# Patient Record
(demographics unavailable — no encounter records)

---

## 2025-03-17 NOTE — HEALTH RISK ASSESSMENT
[Yes] : Yes [2 - 4 times a month (2 pts)] : 2-4 times a month (2 points) [1 or 2 (0 pts)] : 1 or 2 (0 points) [Never (0 pts)] : Never (0 points) [No] : In the past 12 months have you used drugs other than those required for medical reasons? No [No falls in past year] : Patient reported no falls in the past year [0] : 2) Feeling down, depressed, or hopeless: Not at all (0) [PHQ-2 Negative - No further assessment needed] : PHQ-2 Negative - No further assessment needed [Never] : Never [Audit-CScore] : 2 [de-identified] : limited exercise recently 2/2 plantar fasciitis of left foot, has been following with Podiatry and PT. [de-identified] : well balanced [[id number]] : 0

## 2025-03-17 NOTE — PHYSICAL EXAM
[Regular Rhythm] : with a regular rhythm [No Edema] : there was no peripheral edema [Normal] : no rash [Coordination Grossly Intact] : coordination grossly intact [No Focal Deficits] : no focal deficits [Normal Gait] : normal gait [Normal Affect] : the affect was normal [Normal Insight/Judgement] : insight and judgment were intact [de-identified] : chronic amblyopia right eye [de-identified] : mild tachycardia to the 110's with regular rhythm

## 2025-03-17 NOTE — REVIEW OF SYSTEMS
[Chest Pain] : no chest pain [Palpitations] : palpitations [Lower Ext Edema] : no lower extremity edema [Suicidal] : not suicidal [Insomnia] : insomnia [Anxiety] : anxiety [Depression] : no depression [Negative] : Heme/Lymph

## 2025-03-17 NOTE — HISTORY OF PRESENT ILLNESS
[FreeTextEntry8] : Pt reports 5 days of heart racing sensation, increased anxiety. Pt has always had anxiety, feels she manages it fairly well at home but feels current anxiety symptoms are worsening. Denies CP, SOB, dizziness, lightheadedness, LOC. Pt states this morning woke up with congestion but has not otherwise been feeling sick. Denies fevers, abd pain, N/V/D. Has cut out Diet Coke the past 2 weeks (does not drink any other caffeine).  Not sleeping well, wakes up 1-3 times per night due to nocturia.  Has seen Uro and UroGYN.  Was not happy with treatment options at last visit but might consider options again as symptoms seem to be getting in the way of her sleep now.  Pt has scheduled appointment with a therapist on Monday, 3/24/25. Denies depression symptoms.  Pt has not been seen at our office since 10/2023.  Last CPE 02/2023, recommended pt schedule. Pt c/o anxiety at last CPE, however was not ready to start medication at that time.  Pt had cardiac workup with Dr. Wolff 2-3 years ago for tachycardia, all came back negative.  Does not follow regularly.  B/w in 02/2023 showed subclinical hyperthyroidism, rpt labs 10/2023 WNL. Denies diaphoresis, night sweats, abnormal weight loss.  Has had weight gain over the past few months.  Pt has become more sedentary 2/2 plantar fasciitis (currently in PT, ordered by Podiatry).  Pt recently joined [employer].

## 2025-03-17 NOTE — ASSESSMENT
[FreeTextEntry1] : Patient is a 53yo female who presents to the office complaining of increased anxiety, palpitations/tachycardia over the past 5 days.  Pt feels anxiety symptoms are out of proportion to what is going on in her life.  Does not feel she has increased stressors.  HR at home 100's-120's.  Typically when 120's, comes down to 100-110's quickly.  Denies CP, SOB, lightheadedness/dizziness/LOC with symptoms.  Pt has hx subclinical hyperthyroidism in the past (02/2023).  Rpt labs in 10/2023 WNL.  Has not had labs since. Will r/o physiologic cause of current symptoms with labs today. EKG performed in office shows ST, rate 109, no acute ST/T changes, no A. fib or other arrhythmias. Will call pt with these results.  Pt feels ready to start medication for anxiety. Discussed with pt will r/o physiologic cause/address physiologic cause with b/w first today. If labs WNL, will start Sertraline 25mg daily. Pt advised can take 2-4 weeks to show full efficacy.  Pt overdue for CPE. Will RTO 1 month for f/u of current symptoms as well as CPE.  Call the office or go to the ED immediately if you develop new, worsening or concerning symptoms including high fever, severe headache/worst headache of your life, confusion, dizziness/lightheadedness, loss of consciousness, severe chest pain, difficulty breathing, shortness of breath, severe abdominal pain, excessive vomiting/diarrhea, inability to feel/move the extremities, or any other concerning symptoms.

## 2025-04-28 NOTE — HISTORY OF PRESENT ILLNESS
[FreeTextEntry1] : CPE. [de-identified] : Patient is a 53yo female with PMH HLD, anxiety who presents to the office for CPE.    Last CPE:  02/15/2023 with myself.  GYN Exam:  02/2025, Dr. Hi.  Mammogram:  02/2025, [facility] ([address]  Colonoscopy:  02/2023, s/p polypectomy; every 5 years; Dr. Westbrook.  Ophthalmology:  2024. Dermatology:  [facility]. Dentist:  [facility]. Shingles:  due, recommended today.  Pt will return. Flu:  declines.  Tdap:  2019.  COVID:  received.   Hx plantar fasciitis on the left foot, hinders her from exercising. Follows with Podiatry, goes to PT. Has gained some weight 2/2 sedentary lifestyle. Pt states she was recently in [place] and was able to tolerate long days of walking so plans to increase execise.  Pt seen at our office 3/17/25 for evaluation of increased anxiety, tachycardia.    CBC, CMP, thyroid labs WNL at that time.  Pt started on Sertraline 25mg daily but she did not start this medication. Pt was given Xanax 0.25mg #10 for flight anxiety.  Did not end up taking this medication.  Does not take this regularly.  Pt states she has been seeing a therapist twice per month, taking [place] and is feeling much better.  Does not feel the need to take any medication for anxiety at this time.  Pt has chronic/persistent tachycardia.   today in office.  Pt completely asymptomatic, denies lightheadedness, dizziness, LOC, CP, SOB, palpitations.  Pt was seen by Cardiology, Dr. Wolff, in 2023 for tachycardia.  Had workup at that time which was normal.  Tachycardia attributed to anxiety.  Pt's anxiety is significantly better now but tachycardia remains.  Pt will f/u with Cardiology.

## 2025-04-28 NOTE — HEALTH RISK ASSESSMENT
[Never] : Never [HIV test declined] : HIV test declined [Hepatitis C test declined] : Hepatitis C test declined [None] : None [With Family] : lives with family [Employed] : employed [] :  [# Of Children ___] : has [unfilled] children [Feels Safe at Home] : Feels safe at home [Fully functional (bathing, dressing, toileting, transferring, walking, feeding)] : Fully functional (bathing, dressing, toileting, transferring, walking, feeding) [Fully functional (using the telephone, shopping, preparing meals, housekeeping, doing laundry, using] : Fully functional and needs no help or supervision to perform IADLs (using the telephone, shopping, preparing meals, housekeeping, doing laundry, using transportation, managing medications and managing finances) [Yes] : Yes [2 - 4 times a month (2 pts)] : 2-4 times a month (2 points) [1 or 2 (0 pts)] : 1 or 2 (0 points) [Never (0 pts)] : Never (0 points) [No] : In the past 12 months have you used drugs other than those required for medical reasons? No [No falls in past year] : Patient reported no falls in the past year [0] : 2) Feeling down, depressed, or hopeless: Not at all (0) [PHQ-2 Negative - No further assessment needed] : PHQ-2 Negative - No further assessment needed [Sexually Active] : sexually active [With Patient/Caregiver] : , with patient/caregiver [Reviewed no changes] : Reviewed, no changes [I will adhere to the patient's wishes.] : I will adhere to the patient's wishes. [Audit-CScore] : 2 [de-identified] : limited exercise right now 2/2 plantar fasciitis [de-identified] : well balanced [ORI3Docdv] : 0 [EyeExamDate] : 2024 [Patient reported mammogram was normal] : Patient reported mammogram was normal [Patient reported PAP Smear was normal] : Patient reported PAP Smear was normal [Patient reported colonoscopy was abnormal] : Patient reported colonoscopy was abnormal [Change in mental status noted] : No change in mental status noted [Language] : denies difficulty with language [High Risk Behavior] : no high risk behavior [MammogramDate] : 02/2025 [PapSmearDate] : 02/2025 [ColonoscopyDate] : 02/2023 [ColonoscopyComments] : s/p polypectomy; rpt 5 years. 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

## 2025-04-28 NOTE — ASSESSMENT
[Vaccines Reviewed] : Immunizations reviewed today. Please see immunization details in the vaccine log within the immunization flowsheet.  [FreeTextEntry1] : Patient is a 51yo female with PMH HLD, anxiety who presents to the office for CPE.  Health Maintenance - UTD with routine HCM. - Pt will return for b/w. - Eat plenty of fruits and vegetables, especially deeply colored fruits/vegetables (such as leafy greens, peaches) that are more nutrient-dense.  Continue to work hard on diet and exercise, limiting/avoiding saturated fat, fatty foods, greasy foods, red meats, white flour-based carbohydrates (cookies, cakes, white bread, white rice), and added sugars.  Chose whole grain foods and products made with whole grains over refined grains and white flour-based carbohydrates.  Avoid beverages and food with added sugar.  Limit salt intake to improve blood pressure.  Limit alcohol intake. - Try and incorporate a minimum of 150 minutes of exercise per week of moderate activity.  You should also try to incorporate ~20 minutes of weight training to your regimen at least 2-3 times per week.  NASEEM - Pt reports significant improvement of NASEEM since starting with therapist. - Pt never started Sertraline, does not feel she needs it at this time as NASEEM has improved significantly. - Never took Xanax (was prescribed for flying and pt states did not need for flight). - Continue regular f/u with therapist. - Alert office if symptoms worsen.  Tachycardia - EKG ST, no acute ST/T changes, regular rhythm, no significant change from prior from 03/2025. - Recommended pt f/u with Cardiology for re-evaluation as anxiety has improved significantly and HR still elevated. - Pt will monitor HR at home, alert office if HR persistently 110-120's and/or if you develop any symptoms of tachycardia. - TFTs, electrolytes, CBC WNL 1 month ago.  Will rpt with labs.  Call the office or go to the ED immediately if you develop new, worsening or concerning symptoms including high fever, severe headache/worst headache of your life, confusion, dizziness/lightheadedness, loss of consciousness, severe chest pain, difficulty breathing, shortness of breath, severe abdominal pain, excessive vomiting/diarrhea, inability to feel/move the extremities, or any other concerning symptoms.

## 2025-04-28 NOTE — PHYSICAL EXAM
[Regular Rhythm] : with a regular rhythm [No Edema] : there was no peripheral edema [Normal] : no rash [Coordination Grossly Intact] : coordination grossly intact [No Focal Deficits] : no focal deficits [Normal Gait] : normal gait [Normal Affect] : the affect was normal [Normal Insight/Judgement] : insight and judgment were intact [de-identified] : tachycardia, 's.